# Patient Record
Sex: MALE | Race: WHITE | NOT HISPANIC OR LATINO | Employment: OTHER | URBAN - METROPOLITAN AREA
[De-identification: names, ages, dates, MRNs, and addresses within clinical notes are randomized per-mention and may not be internally consistent; named-entity substitution may affect disease eponyms.]

---

## 2017-01-01 ENCOUNTER — HOSPITAL ENCOUNTER (OUTPATIENT)
Dept: GENERAL RADIOLOGY | Facility: HOSPITAL | Age: 72
Discharge: HOME | End: 2017-03-22
Admitting: FAMILY MEDICINE

## 2017-01-01 ENCOUNTER — LAB (OUTPATIENT)
Dept: LAB | Facility: HOSPITAL | Age: 72
End: 2017-01-01
Attending: INTERNAL MEDICINE

## 2017-01-01 ENCOUNTER — RESULTS ENCOUNTER (OUTPATIENT)
Dept: ONCOLOGY | Facility: CLINIC | Age: 72
End: 2017-01-01

## 2017-01-01 ENCOUNTER — TRANSCRIBE ORDERS (OUTPATIENT)
Dept: ADMINISTRATIVE | Facility: HOSPITAL | Age: 72
End: 2017-01-01

## 2017-01-01 ENCOUNTER — OFFICE VISIT (OUTPATIENT)
Dept: ONCOLOGY | Facility: CLINIC | Age: 72
End: 2017-01-01

## 2017-01-01 VITALS
DIASTOLIC BLOOD PRESSURE: 86 MMHG | TEMPERATURE: 97.9 F | HEART RATE: 75 BPM | SYSTOLIC BLOOD PRESSURE: 186 MMHG | BODY MASS INDEX: 31.97 KG/M2 | HEIGHT: 72 IN | RESPIRATION RATE: 18 BRPM | WEIGHT: 236 LBS

## 2017-01-01 DIAGNOSIS — D47.2 MGUS (MONOCLONAL GAMMOPATHY OF UNKNOWN SIGNIFICANCE): ICD-10-CM

## 2017-01-01 DIAGNOSIS — R05.9 COUGH: Primary | ICD-10-CM

## 2017-01-01 DIAGNOSIS — D47.2 MGUS (MONOCLONAL GAMMOPATHY OF UNKNOWN SIGNIFICANCE): Primary | ICD-10-CM

## 2017-01-01 LAB
ALBUMIN SERPL-MCNC: 3.9 G/DL (ref 2.9–4.4)
ALBUMIN SERPL-MCNC: 4.4 G/DL (ref 3.2–4.8)
ALBUMIN/GLOB SERPL: 1.4 {RATIO} (ref 0.7–1.7)
ALBUMIN/GLOB SERPL: 1.9 G/DL (ref 1.5–2.5)
ALP SERPL-CCNC: 98 U/L (ref 25–100)
ALPHA1 GLOB FLD ELPH-MCNC: 0.2 G/DL (ref 0–0.4)
ALPHA2 GLOB SERPL ELPH-MCNC: 0.9 G/DL (ref 0.4–1)
ALT SERPL W P-5'-P-CCNC: 55 U/L (ref 7–40)
ANION GAP SERPL CALCULATED.3IONS-SCNC: 10 MMOL/L (ref 3–11)
AST SERPL-CCNC: 37 U/L (ref 0–33)
B-GLOBULIN SERPL ELPH-MCNC: 1.3 G/DL (ref 0.7–1.3)
B2 MICROGLOB SERPL-MCNC: 3 MG/L (ref 0.6–2.4)
BILIRUB SERPL-MCNC: 1 MG/DL (ref 0.3–1.2)
BUN BLD-MCNC: 19 MG/DL (ref 9–23)
BUN/CREAT SERPL: 21.1 (ref 7–25)
CALCIUM SPEC-SCNC: 10.6 MG/DL (ref 8.7–10.4)
CHLORIDE SERPL-SCNC: 107 MMOL/L (ref 99–109)
CO2 SERPL-SCNC: 30 MMOL/L (ref 20–31)
CREAT BLD-MCNC: 0.9 MG/DL (ref 0.6–1.3)
ERYTHROCYTE [DISTWIDTH] IN BLOOD BY AUTOMATED COUNT: 14.4 % (ref 11.3–14.5)
ERYTHROCYTE [SEDIMENTATION RATE] IN BLOOD: 44 MM/HR (ref 0–20)
GAMMA GLOB SERPL ELPH-MCNC: 0.5 G/DL (ref 0.4–1.8)
GFR SERPL CREATININE-BSD FRML MDRD: 83 ML/MIN/1.73
GLOBULIN SER CALC-MCNC: 2.8 G/DL (ref 2.2–3.9)
GLOBULIN UR ELPH-MCNC: 2.3 GM/DL
GLUCOSE BLD-MCNC: 108 MG/DL (ref 70–100)
HCT VFR BLD AUTO: 40.8 % (ref 38.9–50.9)
HGB BLD-MCNC: 13.4 G/DL (ref 13.1–17.5)
IGA SERPL-MCNC: 305 MG/DL (ref 61–437)
IGG SERPL-MCNC: 451 MG/DL (ref 700–1600)
IGM SERPL-MCNC: 54 MG/DL (ref 15–143)
INTERPRETATION SERPL IEP-IMP: ABNORMAL
KAPPA LC SERPL-MCNC: 19.72 MG/L (ref 3.3–19.4)
KAPPA LC/LAMBDA SER: 2.01 {RATIO} (ref 0.26–1.65)
LAMBDA LC FREE SERPL-MCNC: 9.83 MG/L (ref 5.71–26.3)
Lab: ABNORMAL
M-SPIKE: 0.4 G/DL
MCH RBC QN AUTO: 30.7 PG (ref 27–31)
MCHC RBC AUTO-ENTMCNC: 32.9 G/DL (ref 32–36)
MCV RBC AUTO: 93.5 FL (ref 80–99)
PLATELET # BLD AUTO: 226 10*3/MM3 (ref 150–450)
PMV BLD AUTO: 7.1 FL (ref 6–12)
POTASSIUM BLD-SCNC: 4.4 MMOL/L (ref 3.5–5.5)
PROT SERPL-MCNC: 6.7 G/DL (ref 5.7–8.2)
PROT SERPL-MCNC: 6.7 G/DL (ref 6–8.5)
RBC # BLD AUTO: 4.37 10*6/MM3 (ref 4.2–5.76)
SODIUM BLD-SCNC: 147 MMOL/L (ref 132–146)
WBC NRBC COR # BLD: 5.7 10*3/MM3 (ref 3.5–10.8)

## 2017-01-01 PROCEDURE — 82232 ASSAY OF BETA-2 PROTEIN: CPT | Performed by: INTERNAL MEDICINE

## 2017-01-01 PROCEDURE — 84155 ASSAY OF PROTEIN SERUM: CPT | Performed by: INTERNAL MEDICINE

## 2017-01-01 PROCEDURE — 83883 ASSAY NEPHELOMETRY NOT SPEC: CPT | Performed by: INTERNAL MEDICINE

## 2017-01-01 PROCEDURE — 85027 COMPLETE CBC AUTOMATED: CPT

## 2017-01-01 PROCEDURE — 36415 COLL VENOUS BLD VENIPUNCTURE: CPT

## 2017-01-01 PROCEDURE — 85652 RBC SED RATE AUTOMATED: CPT | Performed by: INTERNAL MEDICINE

## 2017-01-01 PROCEDURE — 86334 IMMUNOFIX E-PHORESIS SERUM: CPT | Performed by: INTERNAL MEDICINE

## 2017-01-01 PROCEDURE — 84165 PROTEIN E-PHORESIS SERUM: CPT | Performed by: INTERNAL MEDICINE

## 2017-01-01 PROCEDURE — 71020 HC CHEST PA AND LATERAL: CPT

## 2017-01-01 PROCEDURE — 99213 OFFICE O/P EST LOW 20 MIN: CPT | Performed by: INTERNAL MEDICINE

## 2017-01-01 PROCEDURE — 80053 COMPREHEN METABOLIC PANEL: CPT | Performed by: INTERNAL MEDICINE

## 2017-01-01 RX ORDER — OXYCODONE AND ACETAMINOPHEN 7.5; 325 MG/1; MG/1
TABLET ORAL
Refills: 0 | COMMUNITY
Start: 2016-01-01

## 2017-02-02 NOTE — PROGRESS NOTES
Chief Complaint   Follow-up IgA kappa multiple MGUS    PROBLEM LIST   Patient Active Problem List   Diagnosis   • MGUS (monoclonal gammopathy of unknown significance)   • Chronic low back pain   • Multiple lipomas       HISTORY OF PRESENT ILLNESS:   Feeling well.  In fact he's probably feeling better now than he has in quite some time.  He's really happy with how his back is doing and his left shoulder.  He saw his orthopedist recently and his physical therapy is just about complete and he's really close to normal in terms of his left shoulder function he's had no recent fevers chills or bleeding issues.  He's had no new neurologic or bony symptoms    Past Medical History, Past Surgical History, Social History, Family History have been reviewed and are without significant changes except as mentioned.      Medications:      Current Outpatient Prescriptions:   •  aspirin 81 MG EC tablet, Take 1 tablet by mouth daily., Disp: , Rfl:   •  buPROPion SR (WELLBUTRIN SR) 150 MG 12 hr tablet, Take 1 tablet by mouth daily., Disp: , Rfl:   •  celecoxib (CeleBREX) 200 MG capsule, Take 200 mg by mouth daily., Disp: , Rfl:   •  ezetimibe (ZETIA) 10 MG tablet, Take 1 tablet by mouth daily., Disp: , Rfl:   •  fesoterodine fumarate (TOVIAZ) 4 MG tablet sustained-release 24 hour capsule, Take 8 mg by mouth daily., Disp: , Rfl:   •  Levomilnacipran HCl ER (FETZIMA) 40 MG capsule sustained-release 24 hr, Take 1 tablet by mouth daily., Disp: , Rfl:   •  levothyroxine (SYNTHROID, LEVOTHROID) 150 MCG tablet, Take 150 mcg by mouth daily., Disp: , Rfl:   •  lisinopril (PRINIVIL,ZESTRIL) 5 MG tablet, Take 2.5 mg by mouth daily., Disp: , Rfl:   •  losartan (COZAAR) 25 MG tablet, Take 25 mg by mouth daily., Disp: , Rfl:   •  nebivolol (BYSTOLIC) 5 MG tablet, Take 2.5 mg by mouth daily., Disp: , Rfl:   •  oxyCODONE-acetaminophen (PERCOCET) 7.5-325 MG per tablet, TAKE 1 TO 2 TABLETS BY MOUTH EVERY 4 TO 6 HOURS AS NEEDED FOR PAIN, Disp: , Rfl:  "0  •  rosuvastatin (CRESTOR) 40 MG tablet, Take 1 tablet by mouth daily., Disp: , Rfl:     ALLERGIES:  No Known Allergies    ROS:  Review of Systems   Constitutional: Negative for activity change and appetite change.   HENT: Negative for mouth sores, sinus pressure and voice change.    Eyes: Negative for visual disturbance.   Respiratory: Negative for shortness of breath.    Cardiovascular: Negative for chest pain.   Gastrointestinal: Negative for abdominal pain and vomiting.   Genitourinary: Negative for dysuria.   Musculoskeletal: Negative for arthralgias and myalgias.        Chronic arthritic symptoms about the same as usual   Skin: Negative for color change.   Neurological: Negative for dizziness, syncope and headaches.   Hematological: Negative for adenopathy.   Psychiatric/Behavioral: Negative for confusion, sleep disturbance and suicidal ideas. The patient is not nervous/anxious.          Objective:    Visit Vitals   • BP (!) 186/86   • Pulse 75   • Temp 97.9 °F (36.6 °C)   • Resp 18   • Ht 72\" (182.9 cm)   • Wt 236 lb (107 kg)   • BMI 32.01 kg/m2       Physical Exam   Constitutional: He is oriented to person, place, and time. He appears well-developed and well-nourished. No distress.   HENT:   Head: Normocephalic.   Mouth/Throat: Oropharynx is clear and moist.   Eyes: Conjunctivae and EOM are normal. No scleral icterus.   Neck: Normal range of motion. Neck supple. No thyromegaly present.   Cardiovascular: Normal rate, regular rhythm and normal heart sounds.    No murmur heard.  Pulmonary/Chest: Effort normal and breath sounds normal. He has no wheezes. He has no rales.   Abdominal: Soft. Bowel sounds are normal. He exhibits no distension and no mass. There is no tenderness.   Musculoskeletal: He exhibits no edema or tenderness.   Lymphadenopathy:     He has no cervical adenopathy.   Neurological: He is alert and oriented to person, place, and time. He displays normal reflexes. No cranial nerve deficit. "   Skin: Skin is warm and dry. No rash noted.   Psychiatric: He has a normal mood and affect. Judgment normal.   Vitals reviewed.             RECENT LABS:  Hematology WBC   Date Value Ref Range Status   01/24/2017 5.70 3.50 - 10.80 10*3/mm3 Final   02/03/2016 5.85 3.50 - 10.80 K/mcL Final     HEMOGLOBIN   Date Value Ref Range Status   01/24/2017 13.4 13.1 - 17.5 g/dL Final   02/03/2016 13.8 13.1 - 17.5 g/dL Final     HEMATOCRIT   Date Value Ref Range Status   01/24/2017 40.8 38.9 - 50.9 % Final   02/03/2016 42.6 38.9 - 50.9 % Final     MCV   Date Value Ref Range Status   01/24/2017 93.5 80.0 - 99.0 fL Final   02/03/2016 91.0 80.0 - 99.0 fL Final     RDW   Date Value Ref Range Status   01/24/2017 14.4 11.3 - 14.5 % Final   11/02/2015 17.3 (H) 11.3 - 14.5 % Final     MPV   Date Value Ref Range Status   01/24/2017 7.1 6.0 - 12.0 fL Final   11/02/2015 6.4 fL Final     PLATELETS   Date Value Ref Range Status   01/24/2017 226 150 - 450 10*3/mm3 Final   02/03/2016 201 150 - 450 K/mcL Final     IMMATURE GRANS %   Date Value Ref Range Status   07/06/2016 0.2 0.0 - 0.6 % Final     NEUTROPHILS, ABSOLUTE   Date Value Ref Range Status   07/06/2016 2.53 1.50 - 8.30 10*3/mm3 Final     NEUTROPHILS ABSOLUTE   Date Value Ref Range Status   02/03/2016 3.29 1.50 - 8.30 K/mcL Final     LYMPHOCYTES, ABSOLUTE   Date Value Ref Range Status   07/06/2016 1.47 0.60 - 4.80 10*3/mm3 Final     LYMPHOCYTES ABSOLUTE   Date Value Ref Range Status   02/03/2016 1.71 0.60 - 4.80 K/mcL Final     MONOCYTES, ABSOLUTE   Date Value Ref Range Status   07/06/2016 0.48 0.00 - 1.00 10*3/mm3 Final     MONOCYTES ABSOLUTE   Date Value Ref Range Status   02/03/2016 0.56 0.00 - 1.00 K/mcL Final     EOSINOPHILS, ABSOLUTE   Date Value Ref Range Status   07/06/2016 0.19 0.10 - 0.30 10*3/mm3 Final     EOSINOPHILS ABSOLUTE   Date Value Ref Range Status   02/03/2016 0.24 0.10 - 0.30 K/mcL Final     BASOPHILS, ABSOLUTE   Date Value Ref Range Status   07/06/2016 0.02 0.00 -  0.20 10*3/mm3 Final     BASOPHILS ABSOLUTE   Date Value Ref Range Status   02/03/2016 0.04 0.00 - 0.20 K/mcL Final     IMMATURE GRANS, ABSOLUTE   Date Value Ref Range Status   07/06/2016 0.01 0.00 - 0.03 10*3/mm3 Final     NRBC   Date Value Ref Range Status   04/28/2015 0.0  Final       GLUCOSE   Date Value Ref Range Status   01/24/2017 108 (H) 70 - 100 mg/dL Final   02/03/2016 106 (H) 70 - 100 mg/dL Final     SODIUM   Date Value Ref Range Status   01/24/2017 147 (H) 132 - 146 mmol/L Final   02/03/2016 144 132 - 146 mmol/L Final     POTASSIUM   Date Value Ref Range Status   01/24/2017 4.4 3.5 - 5.5 mmol/L Final   02/03/2016 5.2 3.5 - 5.5 mmol/L Final     CO2   Date Value Ref Range Status   01/24/2017 30.0 20.0 - 31.0 mmol/L Final   02/03/2016 31 20 - 31 mmol/L Final     CHLORIDE   Date Value Ref Range Status   01/24/2017 107 99 - 109 mmol/L Final   02/03/2016 107 99 - 109 mmol/L Final     ANION GAP   Date Value Ref Range Status   01/24/2017 10.0 3.0 - 11.0 mmol/L Final   02/03/2016 6 3 - 11 mmol/L Final     CREATININE   Date Value Ref Range Status   01/24/2017 0.90 0.60 - 1.30 mg/dL Final   02/03/2016 1.0 0.6 - 1.3 mg/dL Final     BUN   Date Value Ref Range Status   01/24/2017 19 9 - 23 mg/dL Final   02/03/2016 19 9 - 23 mg/dL Final     BUN/CREATININE RATIO   Date Value Ref Range Status   01/24/2017 21.1 7.0 - 25.0 Final     CALCIUM   Date Value Ref Range Status   01/24/2017 10.6 (H) 8.7 - 10.4 mg/dL Final   02/03/2016 10.2 8.7 - 10.4 mg/dL Final     EGFR NON  AMER   Date Value Ref Range Status   01/24/2017 83 >60 mL/min/1.73 Final     ALKALINE PHOSPHATASE   Date Value Ref Range Status   01/24/2017 98 25 - 100 U/L Final   02/03/2016 91 25 - 100 Units/L Final     TOTAL PROTEIN   Date Value Ref Range Status   01/24/2017 6.7 5.7 - 8.2 g/dL Final   02/03/2016 6.7 5.7 - 8.2 g/dL Final     ALT (SGPT)   Date Value Ref Range Status   01/24/2017 55 (H) 7 - 40 U/L Final   02/03/2016 41 (H) 7 - 40 Units/L Final     AST  (SGOT)   Date Value Ref Range Status   01/24/2017 37 (H) 0 - 33 U/L Final   02/03/2016 36 (H) 0 - 33 Units/L Final     TOTAL BILIRUBIN   Date Value Ref Range Status   01/24/2017 1.0 0.3 - 1.2 mg/dL Final   02/03/2016 0.8 0.3 - 1.2 mg/dL Final     ALBUMIN   Date Value Ref Range Status   01/24/2017 4.40 3.20 - 4.80 g/dL Final   01/24/2017 3.9 2.9 - 4.4 g/dL Final     GLOBULIN   Date Value Ref Range Status   01/24/2017 2.3 gm/dL Final     A/G RATIO   Date Value Ref Range Status   01/24/2017 1.9 1.5 - 2.5 g/dL Final   01/24/2017 1.4 0.7 - 1.7 Final          Perf Status: 0    Assessment/Plan    Diagnoses and all orders for this visit:    MGUS (monoclonal gammopathy of unknown significance)      Mr. Drake is doing well.  He has no evolution of his IgA kappa monoclonal gammopathy.  I'll continue to see him at six-month intervals.  Reviewed his labs with him of course          Visit time was 19 minutes, greater than 50% spent in counseling    Esvin Purvis MD , 2/2/2017    CC: